# Patient Record
Sex: MALE | Race: WHITE | NOT HISPANIC OR LATINO
[De-identification: names, ages, dates, MRNs, and addresses within clinical notes are randomized per-mention and may not be internally consistent; named-entity substitution may affect disease eponyms.]

---

## 2017-01-05 ENCOUNTER — APPOINTMENT (OUTPATIENT)
Dept: INFUSION THERAPY | Facility: CLINIC | Age: 47
End: 2017-01-05

## 2017-01-05 ENCOUNTER — APPOINTMENT (OUTPATIENT)
Dept: HEMATOLOGY ONCOLOGY | Facility: CLINIC | Age: 47
End: 2017-01-05

## 2017-01-05 VITALS
DIASTOLIC BLOOD PRESSURE: 106 MMHG | HEART RATE: 76 BPM | SYSTOLIC BLOOD PRESSURE: 165 MMHG | TEMPERATURE: 97.3 F | RESPIRATION RATE: 12 BRPM

## 2017-01-05 DIAGNOSIS — D75.1 SECONDARY POLYCYTHEMIA: ICD-10-CM

## 2017-01-05 LAB
BASOPHILS # BLD: 0.07 TH/MM3
BASOPHILS NFR BLD: 0.9 %
EOSINOPHIL # BLD: 0.22 TH/MM3
EOSINOPHIL NFR BLD: 2.8 %
ERYTHROCYTE [DISTWIDTH] IN BLOOD BY AUTOMATED COUNT: 14.4 %
GRANULOCYTES # BLD: 4.18 TH/MM3
GRANULOCYTES NFR BLD: 52.9 %
HCT VFR BLD AUTO: 54.7 %
HGB BLD-MCNC: 18.7 G/DL
IMM GRANULOCYTES # BLD: 0.08 TH/MM3
IMM GRANULOCYTES NFR BLD: 1 %
LYMPHOCYTES # BLD: 2.63 TH/MM3
LYMPHOCYTES NFR BLD: 33.3 %
MCH RBC QN AUTO: 29.9 PG
MCHC RBC AUTO-ENTMCNC: 34.2 G/DL
MCV RBC AUTO: 87.5 FL
MONOCYTES # BLD: 0.72 TH/MM3
MONOCYTES NFR BLD: 9.1 %
PLATELET # BLD: 248 TH/MM3
PMV BLD AUTO: 10.1 FL
RBC # BLD AUTO: 6.25 MIL/MM3
WBC # BLD: 7.9 TH/MM3

## 2017-02-02 ENCOUNTER — APPOINTMENT (OUTPATIENT)
Dept: INFUSION THERAPY | Facility: CLINIC | Age: 47
End: 2017-02-02

## 2017-02-07 LAB
BASOPHILS # BLD: 0.06 TH/MM3
BASOPHILS NFR BLD: 0.8 %
EOSINOPHIL # BLD: 0.19 TH/MM3
EOSINOPHIL NFR BLD: 2.5 %
ERYTHROCYTE [DISTWIDTH] IN BLOOD BY AUTOMATED COUNT: 14.1 %
GRANULOCYTES # BLD: 4.05 TH/MM3
GRANULOCYTES NFR BLD: 52.8 %
HCT VFR BLD AUTO: 50 %
HGB BLD-MCNC: 18.2 G/DL
IMM GRANULOCYTES # BLD: 0.07 TH/MM3
IMM GRANULOCYTES NFR BLD: 0.9 %
LYMPHOCYTES # BLD: 2.81 TH/MM3
LYMPHOCYTES NFR BLD: 36.6 %
MCH RBC QN AUTO: 29.8 PG
MCHC RBC AUTO-ENTMCNC: 36.4 G/DL
MCV RBC AUTO: 81.8 FL
MONOCYTES # BLD: 0.49 TH/MM3
MONOCYTES NFR BLD: 6.4 %
PLATELET # BLD: 236 TH/MM3
PMV BLD AUTO: 9.8 FL
RBC # BLD AUTO: 6.11 MIL/MM3
WBC # BLD: 7.67 TH/MM3

## 2017-03-02 ENCOUNTER — APPOINTMENT (OUTPATIENT)
Dept: INFUSION THERAPY | Facility: CLINIC | Age: 47
End: 2017-03-02

## 2017-03-06 LAB
BASOPHILS # BLD: 0.08 TH/MM3
BASOPHILS NFR BLD: 1 %
EOSINOPHIL # BLD: 0.31 TH/MM3
EOSINOPHIL NFR BLD: 3.7 %
ERYTHROCYTE [DISTWIDTH] IN BLOOD BY AUTOMATED COUNT: 14.2 %
GRANULOCYTES # BLD: 4.32 TH/MM3
GRANULOCYTES NFR BLD: 52.3 %
HCT VFR BLD AUTO: 51.6 %
HGB BLD-MCNC: 17.7 G/DL
IMM GRANULOCYTES # BLD: 0.11 TH/MM3
IMM GRANULOCYTES NFR BLD: 1.3 %
LYMPHOCYTES # BLD: 2.62 TH/MM3
LYMPHOCYTES NFR BLD: 31.7 %
MCH RBC QN AUTO: 29.7 PG
MCHC RBC AUTO-ENTMCNC: 34.3 G/DL
MCV RBC AUTO: 86.7 FL
MONOCYTES # BLD: 0.83 TH/MM3
MONOCYTES NFR BLD: 10 %
PLATELET # BLD: 232 TH/MM3
PMV BLD AUTO: 10.1 FL
RBC # BLD AUTO: 5.95 MIL/MM3
WBC # BLD: 8.27 TH/MM3

## 2017-03-30 ENCOUNTER — APPOINTMENT (OUTPATIENT)
Dept: INFUSION THERAPY | Facility: CLINIC | Age: 47
End: 2017-03-30

## 2017-03-30 ENCOUNTER — OUTPATIENT (OUTPATIENT)
Dept: OUTPATIENT SERVICES | Facility: HOSPITAL | Age: 47
LOS: 1 days | Discharge: HOME | End: 2017-03-30

## 2017-03-30 DIAGNOSIS — Z00.00 ENCOUNTER FOR GENERAL ADULT MEDICAL EXAMINATION W/OUT ABNORMAL FINDINGS: ICD-10-CM

## 2017-03-30 LAB
BASOPHILS # BLD: 0.06 TH/MM3
BASOPHILS NFR BLD: 0.7 %
EOSINOPHIL # BLD: 0.13 TH/MM3
EOSINOPHIL NFR BLD: 1.6 %
ERYTHROCYTE [DISTWIDTH] IN BLOOD BY AUTOMATED COUNT: 14.4 %
GRANULOCYTES # BLD: 4.09 TH/MM3
GRANULOCYTES NFR BLD: 51.2 %
HCT VFR BLD AUTO: 53.9 %
HGB BLD-MCNC: 18.5 G/DL
IMM GRANULOCYTES # BLD: 0.05 TH/MM3
IMM GRANULOCYTES NFR BLD: 0.6 %
LYMPHOCYTES # BLD: 3.02 TH/MM3
LYMPHOCYTES NFR BLD: 37.7 %
MCH RBC QN AUTO: 29.4 PG
MCHC RBC AUTO-ENTMCNC: 34.3 G/DL
MCV RBC AUTO: 85.7 FL
MONOCYTES # BLD: 0.66 TH/MM3
MONOCYTES NFR BLD: 8.2 %
PLATELET # BLD: 229 TH/MM3
PMV BLD AUTO: 10.5 FL
RBC # BLD AUTO: 6.29 MIL/MM3
WBC # BLD: 8.01 TH/MM3

## 2017-03-31 ENCOUNTER — OTHER (OUTPATIENT)
Age: 47
End: 2017-03-31

## 2017-04-20 ENCOUNTER — APPOINTMENT (OUTPATIENT)
Dept: INFUSION THERAPY | Facility: CLINIC | Age: 47
End: 2017-04-20

## 2017-05-04 ENCOUNTER — APPOINTMENT (OUTPATIENT)
Dept: INFUSION THERAPY | Facility: CLINIC | Age: 47
End: 2017-05-04

## 2017-05-04 ENCOUNTER — APPOINTMENT (OUTPATIENT)
Dept: HEMATOLOGY ONCOLOGY | Facility: CLINIC | Age: 47
End: 2017-05-04

## 2017-06-25 ENCOUNTER — EMERGENCY (EMERGENCY)
Facility: HOSPITAL | Age: 47
LOS: 0 days | Discharge: HOME | End: 2017-06-25
Admitting: INTERNAL MEDICINE

## 2017-06-25 DIAGNOSIS — Z79.899 OTHER LONG TERM (CURRENT) DRUG THERAPY: ICD-10-CM

## 2017-06-25 DIAGNOSIS — R53.83 OTHER FATIGUE: ICD-10-CM

## 2017-06-25 DIAGNOSIS — R53.81 OTHER MALAISE: ICD-10-CM

## 2017-06-25 DIAGNOSIS — I21.3 ST ELEVATION (STEMI) MYOCARDIAL INFARCTION OF UNSPECIFIED SITE: ICD-10-CM

## 2017-06-25 DIAGNOSIS — R06.02 SHORTNESS OF BREATH: ICD-10-CM

## 2017-06-25 DIAGNOSIS — I10 ESSENTIAL (PRIMARY) HYPERTENSION: ICD-10-CM

## 2017-06-25 DIAGNOSIS — Z95.5 PRESENCE OF CORONARY ANGIOPLASTY IMPLANT AND GRAFT: ICD-10-CM

## 2017-06-27 DIAGNOSIS — D75.1 SECONDARY POLYCYTHEMIA: ICD-10-CM

## 2019-05-29 ENCOUNTER — OUTPATIENT (OUTPATIENT)
Dept: OUTPATIENT SERVICES | Facility: HOSPITAL | Age: 49
LOS: 1 days | Discharge: HOME | End: 2019-05-29
Payer: MEDICAID

## 2019-05-29 VITALS
DIASTOLIC BLOOD PRESSURE: 93 MMHG | WEIGHT: 233.47 LBS | HEART RATE: 61 BPM | RESPIRATION RATE: 14 BRPM | OXYGEN SATURATION: 100 % | SYSTOLIC BLOOD PRESSURE: 147 MMHG | TEMPERATURE: 96 F | HEIGHT: 69 IN

## 2019-05-29 DIAGNOSIS — G56.01 CARPAL TUNNEL SYNDROME, RIGHT UPPER LIMB: ICD-10-CM

## 2019-05-29 DIAGNOSIS — Z01.818 ENCOUNTER FOR OTHER PREPROCEDURAL EXAMINATION: ICD-10-CM

## 2019-05-29 LAB
ALBUMIN SERPL ELPH-MCNC: 4.6 G/DL — SIGNIFICANT CHANGE UP (ref 3.5–5.2)
ALP SERPL-CCNC: 80 U/L — SIGNIFICANT CHANGE UP (ref 30–115)
ALT FLD-CCNC: 39 U/L — SIGNIFICANT CHANGE UP (ref 0–41)
ANION GAP SERPL CALC-SCNC: 12 MMOL/L — SIGNIFICANT CHANGE UP (ref 7–14)
APTT BLD: 32 SEC — SIGNIFICANT CHANGE UP (ref 27–39.2)
AST SERPL-CCNC: 26 U/L — SIGNIFICANT CHANGE UP (ref 0–41)
BILIRUB SERPL-MCNC: 0.7 MG/DL — SIGNIFICANT CHANGE UP (ref 0.2–1.2)
BUN SERPL-MCNC: 18 MG/DL — SIGNIFICANT CHANGE UP (ref 10–20)
CALCIUM SERPL-MCNC: 9.8 MG/DL — SIGNIFICANT CHANGE UP (ref 8.5–10.1)
CHLORIDE SERPL-SCNC: 103 MMOL/L — SIGNIFICANT CHANGE UP (ref 98–110)
CO2 SERPL-SCNC: 27 MMOL/L — SIGNIFICANT CHANGE UP (ref 17–32)
CREAT SERPL-MCNC: 0.9 MG/DL — SIGNIFICANT CHANGE UP (ref 0.7–1.5)
GLUCOSE SERPL-MCNC: 94 MG/DL — SIGNIFICANT CHANGE UP (ref 70–99)
HCT VFR BLD CALC: 44.5 % — SIGNIFICANT CHANGE UP (ref 42–52)
HGB BLD-MCNC: 13.6 G/DL — LOW (ref 14–18)
INR BLD: 0.96 RATIO — SIGNIFICANT CHANGE UP (ref 0.65–1.3)
MCHC RBC-ENTMCNC: 24.1 PG — LOW (ref 27–31)
MCHC RBC-ENTMCNC: 30.6 G/DL — LOW (ref 32–37)
MCV RBC AUTO: 78.8 FL — LOW (ref 80–94)
NRBC # BLD: 0 /100 WBCS — SIGNIFICANT CHANGE UP (ref 0–0)
PLATELET # BLD AUTO: 282 K/UL — SIGNIFICANT CHANGE UP (ref 130–400)
POTASSIUM SERPL-MCNC: 4.4 MMOL/L — SIGNIFICANT CHANGE UP (ref 3.5–5)
POTASSIUM SERPL-SCNC: 4.4 MMOL/L — SIGNIFICANT CHANGE UP (ref 3.5–5)
PROT SERPL-MCNC: 7.5 G/DL — SIGNIFICANT CHANGE UP (ref 6–8)
PROTHROM AB SERPL-ACNC: 11 SEC — SIGNIFICANT CHANGE UP (ref 9.95–12.87)
RBC # BLD: 5.65 M/UL — SIGNIFICANT CHANGE UP (ref 4.7–6.1)
RBC # FLD: 18.8 % — HIGH (ref 11.5–14.5)
SODIUM SERPL-SCNC: 142 MMOL/L — SIGNIFICANT CHANGE UP (ref 135–146)
WBC # BLD: 7.05 K/UL — SIGNIFICANT CHANGE UP (ref 4.8–10.8)
WBC # FLD AUTO: 7.05 K/UL — SIGNIFICANT CHANGE UP (ref 4.8–10.8)

## 2019-05-29 PROCEDURE — 71046 X-RAY EXAM CHEST 2 VIEWS: CPT | Mod: 26

## 2019-05-29 PROCEDURE — 93010 ELECTROCARDIOGRAM REPORT: CPT

## 2019-05-29 NOTE — H&P PST ADULT - HISTORY OF PRESENT ILLNESS
48 year old male here for right hand surgery as been having problems for about 6 months , hurt falling feb 2019  fos=3  denies + chest pain ,states has it now gets it a lot states jose julian gave him xanax and does not want to take anymore. refuses to go to ed as we speak. denies sob denies palp  denies recent uri or uti

## 2019-05-29 NOTE — H&P PST ADULT - NSICDXPASTMEDICALHX_GEN_ALL_CORE_FT
PAST MEDICAL HISTORY:  Heart attack 3 years ago, 4 stents placed    History of angina     HTN (hypertension)     Hypercholesteremia     Obesity     Polycythemia     Sleep apnea does not use c-pap

## 2019-06-13 NOTE — PROGRESS NOTE ADULT - SUBJECTIVE AND OBJECTIVE BOX
PAST documents reviewed - MED. DIR. PAST - ANESTHESIA - as of this review : patient is a CAD patient w/ stents and Angina Pectoris - a Cardiology consult was requested and received " clear from a cardiac standpoint " .  I feel the patient's low risk surgery may proceed w/o further evaluations .

## 2019-06-14 NOTE — ASU PATIENT PROFILE, ADULT - PMH
Heart attack  3 years ago, 4 stents placed  History of angina    HTN (hypertension)    Hypercholesteremia    Obesity    Polycythemia    Sleep apnea  does not use c-pap

## 2019-06-17 ENCOUNTER — OUTPATIENT (OUTPATIENT)
Dept: OUTPATIENT SERVICES | Facility: HOSPITAL | Age: 49
LOS: 1 days | Discharge: HOME | End: 2019-06-17

## 2019-06-17 VITALS
DIASTOLIC BLOOD PRESSURE: 87 MMHG | SYSTOLIC BLOOD PRESSURE: 136 MMHG | OXYGEN SATURATION: 98 % | HEART RATE: 60 BPM | RESPIRATION RATE: 18 BRPM

## 2019-06-17 VITALS
RESPIRATION RATE: 17 BRPM | DIASTOLIC BLOOD PRESSURE: 85 MMHG | WEIGHT: 233.47 LBS | TEMPERATURE: 98 F | HEART RATE: 71 BPM | HEIGHT: 69 IN | SYSTOLIC BLOOD PRESSURE: 137 MMHG | OXYGEN SATURATION: 98 %

## 2019-06-17 RX ORDER — SODIUM CHLORIDE 9 MG/ML
1000 INJECTION, SOLUTION INTRAVENOUS
Refills: 0 | Status: DISCONTINUED | OUTPATIENT
Start: 2019-06-17 | End: 2019-07-02

## 2019-06-17 RX ORDER — ACETAMINOPHEN 500 MG
1000 TABLET ORAL ONCE
Refills: 0 | Status: COMPLETED | OUTPATIENT
Start: 2019-06-17 | End: 2019-06-17

## 2019-06-17 RX ADMIN — SODIUM CHLORIDE 75 MILLILITER(S): 9 INJECTION, SOLUTION INTRAVENOUS at 09:38

## 2019-06-17 RX ADMIN — Medication 400 MILLIGRAM(S): at 10:01

## 2019-06-17 NOTE — CHART NOTE - NSCHARTNOTEFT_GEN_A_CORE
PACU ANESTHESIA ADMISSION NOTE      Procedure: Endoscopic carpal tunnel release: right    Post op diagnosis:  Carpal tunnel syndrome on right      ____  Intubated  TV:______       Rate: ______      FiO2: ______    __X__  Patent Airway    ___X_  Full return of protective reflexes    ____X  Full recovery from anesthesia / back to baseline     Vitals:   T: 37          R:    14              BP:139/89                  Sat:97                   P: 77      Mental Status:  ___X_ Awake   _____ Alert   _____ Drowsy   _____ Sedated    Nausea/Vomiting:  __X__ NO  ______Yes,   See Post - Op Orders          Pain Scale (0-10):  _X____    Treatment: ____ None    ____ See Post - Op/PCA Orders    Post - Operative Fluids:   X____ Oral   ____ See Post - Op Orders    Plan: Discharge:   __X__Home       _____Floor     _____Critical Care    _____  Other:_________________    Comments:  Uneventful course of anesthesia

## 2019-06-21 DIAGNOSIS — G56.01 CARPAL TUNNEL SYNDROME, RIGHT UPPER LIMB: ICD-10-CM

## 2019-06-21 DIAGNOSIS — I10 ESSENTIAL (PRIMARY) HYPERTENSION: ICD-10-CM

## 2019-06-21 DIAGNOSIS — G47.33 OBSTRUCTIVE SLEEP APNEA (ADULT) (PEDIATRIC): ICD-10-CM

## 2019-06-21 DIAGNOSIS — E66.9 OBESITY, UNSPECIFIED: ICD-10-CM

## 2019-06-21 DIAGNOSIS — E78.00 PURE HYPERCHOLESTEROLEMIA, UNSPECIFIED: ICD-10-CM

## 2020-09-28 PROBLEM — I10 ESSENTIAL (PRIMARY) HYPERTENSION: Chronic | Status: ACTIVE | Noted: 2019-05-29

## 2020-09-28 PROBLEM — D75.1 SECONDARY POLYCYTHEMIA: Chronic | Status: ACTIVE | Noted: 2019-05-29

## 2020-09-28 PROBLEM — E66.9 OBESITY, UNSPECIFIED: Chronic | Status: ACTIVE | Noted: 2019-05-29

## 2020-09-28 PROBLEM — E78.00 PURE HYPERCHOLESTEROLEMIA, UNSPECIFIED: Chronic | Status: ACTIVE | Noted: 2019-05-29

## 2020-09-28 PROBLEM — Z86.79 PERSONAL HISTORY OF OTHER DISEASES OF THE CIRCULATORY SYSTEM: Chronic | Status: ACTIVE | Noted: 2019-05-29

## 2020-10-01 ENCOUNTER — OUTPATIENT (OUTPATIENT)
Dept: OUTPATIENT SERVICES | Facility: HOSPITAL | Age: 50
LOS: 1 days | End: 2020-10-01
Payer: MEDICAID

## 2020-10-03 ENCOUNTER — OUTPATIENT (OUTPATIENT)
Dept: OUTPATIENT SERVICES | Facility: HOSPITAL | Age: 50
LOS: 1 days | Discharge: HOME | End: 2020-10-03

## 2020-10-03 DIAGNOSIS — Z11.59 ENCOUNTER FOR SCREENING FOR OTHER VIRAL DISEASES: ICD-10-CM

## 2020-10-06 ENCOUNTER — OUTPATIENT (OUTPATIENT)
Dept: OUTPATIENT SERVICES | Facility: HOSPITAL | Age: 50
LOS: 1 days | Discharge: HOME | End: 2020-10-06

## 2020-10-06 VITALS
RESPIRATION RATE: 16 BRPM | SYSTOLIC BLOOD PRESSURE: 130 MMHG | DIASTOLIC BLOOD PRESSURE: 81 MMHG | HEART RATE: 86 BPM | WEIGHT: 240.08 LBS | TEMPERATURE: 98 F | HEIGHT: 69 IN | OXYGEN SATURATION: 100 %

## 2020-10-06 VITALS
WEIGHT: 240.08 LBS | HEART RATE: 74 BPM | OXYGEN SATURATION: 98 % | SYSTOLIC BLOOD PRESSURE: 130 MMHG | RESPIRATION RATE: 12 BRPM | HEIGHT: 69 IN | DIASTOLIC BLOOD PRESSURE: 81 MMHG

## 2020-10-06 LAB
ANION GAP SERPL CALC-SCNC: 10 MMOL/L — SIGNIFICANT CHANGE UP (ref 7–14)
BUN SERPL-MCNC: 19 MG/DL — SIGNIFICANT CHANGE UP (ref 10–20)
CALCIUM SERPL-MCNC: 8.9 MG/DL — SIGNIFICANT CHANGE UP (ref 8.5–10.1)
CHLORIDE SERPL-SCNC: 104 MMOL/L — SIGNIFICANT CHANGE UP (ref 98–110)
CO2 SERPL-SCNC: 26 MMOL/L — SIGNIFICANT CHANGE UP (ref 17–32)
CREAT SERPL-MCNC: 1 MG/DL — SIGNIFICANT CHANGE UP (ref 0.7–1.5)
GLUCOSE SERPL-MCNC: 109 MG/DL — HIGH (ref 70–99)
HCT VFR BLD CALC: 48.2 % — SIGNIFICANT CHANGE UP (ref 42–52)
HGB BLD-MCNC: 14.7 G/DL — SIGNIFICANT CHANGE UP (ref 14–18)
MCHC RBC-ENTMCNC: 25 PG — LOW (ref 27–31)
MCHC RBC-ENTMCNC: 30.5 G/DL — LOW (ref 32–37)
MCV RBC AUTO: 82.1 FL — SIGNIFICANT CHANGE UP (ref 80–94)
NRBC # BLD: 0 /100 WBCS — SIGNIFICANT CHANGE UP (ref 0–0)
PLATELET # BLD AUTO: 318 K/UL — SIGNIFICANT CHANGE UP (ref 130–400)
POTASSIUM SERPL-MCNC: 4.1 MMOL/L — SIGNIFICANT CHANGE UP (ref 3.5–5)
POTASSIUM SERPL-SCNC: 4.1 MMOL/L — SIGNIFICANT CHANGE UP (ref 3.5–5)
RBC # BLD: 5.87 M/UL — SIGNIFICANT CHANGE UP (ref 4.7–6.1)
RBC # FLD: 17.1 % — HIGH (ref 11.5–14.5)
SODIUM SERPL-SCNC: 140 MMOL/L — SIGNIFICANT CHANGE UP (ref 135–146)
WBC # BLD: 7.32 K/UL — SIGNIFICANT CHANGE UP (ref 4.8–10.8)
WBC # FLD AUTO: 7.32 K/UL — SIGNIFICANT CHANGE UP (ref 4.8–10.8)

## 2020-10-06 RX ORDER — OXYCODONE AND ACETAMINOPHEN 5; 325 MG/1; MG/1
0 TABLET ORAL
Qty: 0 | Refills: 0 | DISCHARGE

## 2020-10-06 NOTE — CHART NOTE - NSCHARTNOTEFT_GEN_A_CORE
PRE-OP DIAGNOSIS: suspected CAD/stable angina    PROCEDURE: [facility] with coronary angiography    Physician: REMINGTON Geller  Assistant: BILL Kaminski    ANESTHESIA TYPE:  [ x ] Sedation  [ x ] Local/Regional    ESTIMATED BLOOD LOSS:    10   mL    CONDITION  [ x ]Good    SPECIMENS REMOVED (IF APPLICABLE): None    IV CONTRAST:    50    mL    FINDINGS    Left Heart Catheterization:  LVEF%: 60  LVEDP: 5  Non-obstructive CAD  Right radial 6 Fr - radial D stat    The coronary circulation is right dominant. There was no angiographic evidence for occlusive coronary artery disease. Left main: Normal. The vessel was medium sized. LAD: The vessel was medium sized. Proximal LAD: Angiography showed minor luminal irregularities with no flow limiting lesions. Mid LAD: The vessel was medium sized. Angiography showed mild atherosclerosis with no flow limiting lesions. Distal LAD: Normal. 1st diagonal: Normal. Circumflex: The vessel was medium sized. Proximal circumflex: Normal. There was a 0 % stenosis at the site of a prior stent. Distal circumflex: Normal. 1st obtuse marginal: Normal. The vessel was medium sized. 2nd obtuse marginal: The vessel was medium sized. Angiography showed minor luminal irregularities with no flow limiting lesions. There was a 0 % stenosis at the site of a prior stent. RCA: The vessel was large sized (dominant). Proximal RCA: There was a 0 % stenosis at the site of a prior stent. Mid RCA: There was a 0 % stenosis at the site of a prior stent. Distal RCA: Normal. Right PDA: Normal. Right posterolateral segment: Normal.     POST-OP DIAGNOSIS  Non-obstructive CAD    PLAN OF CARE  [x ] D/C Home today   cc/hr for 4 hours

## 2020-10-06 NOTE — H&P CARDIOLOGY - HISTORY OF PRESENT ILLNESS
50 y/old M here for [facility] due to Hx of CAD and New symptoms: Severe fatigue which remind him of CAD in PAST. After phlebotomy for Polycythemia he still feels fatigue.   PMH: MI, CAD, PCI stents x4, EDDIE, DLD, Polycythemia secondary to smoking.      Pre cath note:    indication:  [x] STEMI                [ ] NSTEMI                 [ ] Acute coronary syndrome                     [ ]Unstable Angina   [ ] high risk  [ ] intermediate risk  [ ] low risk                     [x] Stable Angina     non-invasive testing:                          Date:                     result: [ ] high risk  [x] intermediate risk  [ ] low risk    Anti- Anginal medications:                    [ ] not used                       [x] used                   [ ] not used but strong indication not to use    Ejection Fraction                   [ ] <29            [ ] 30-39%   [ ] 40-49%     [x]>50%    CHF                   [ ] active (within last 14 days on meds   [ ] Chronic (on meds but no exacerbation)    COPD                   [ ] mild (on chronic bronchodilators)  [ ] moderate (on chronic steroid therapy)      [ ] severe (indication for home O2 or PACO2 >50)    Other risk factors:                       [x ] Previous MI                     [ ] CVA/ stroke                    [ ] carotid stent/ CEA                    [ ] PVD/PAD- (arterial aneurysm, non-palpable pulses, tortuous vessel with inability to insert catheter, infra-renal dissection, renal or subclavian artery stenosis)                    [ ] diabetic                    [ ] previous CABG                    [ ] Renal Failure                           14.7   7.32  )-----------( 318      ( 06 Oct 2020 08:15 )             48.2       -Start IV Fluids NS at : 3ml/Kg for 1 Hr prior to procedure

## 2020-10-09 DIAGNOSIS — Z95.5 PRESENCE OF CORONARY ANGIOPLASTY IMPLANT AND GRAFT: ICD-10-CM

## 2020-10-09 DIAGNOSIS — E78.49 OTHER HYPERLIPIDEMIA: ICD-10-CM

## 2020-10-09 DIAGNOSIS — D75.1 SECONDARY POLYCYTHEMIA: ICD-10-CM

## 2020-10-09 DIAGNOSIS — Z79.82 LONG TERM (CURRENT) USE OF ASPIRIN: ICD-10-CM

## 2020-10-09 DIAGNOSIS — G47.33 OBSTRUCTIVE SLEEP APNEA (ADULT) (PEDIATRIC): ICD-10-CM

## 2020-10-09 DIAGNOSIS — R07.9 CHEST PAIN, UNSPECIFIED: ICD-10-CM

## 2020-10-09 DIAGNOSIS — I25.118 ATHEROSCLEROTIC HEART DISEASE OF NATIVE CORONARY ARTERY WITH OTHER FORMS OF ANGINA PECTORIS: ICD-10-CM

## 2020-10-09 DIAGNOSIS — I25.2 OLD MYOCARDIAL INFARCTION: ICD-10-CM

## 2020-10-09 DIAGNOSIS — I10 ESSENTIAL (PRIMARY) HYPERTENSION: ICD-10-CM

## 2020-10-09 DIAGNOSIS — E66.9 OBESITY, UNSPECIFIED: ICD-10-CM

## 2020-10-10 ENCOUNTER — TRANSCRIPTION ENCOUNTER (OUTPATIENT)
Age: 50
End: 2020-10-10

## 2020-10-10 ENCOUNTER — EMERGENCY (EMERGENCY)
Facility: HOSPITAL | Age: 50
LOS: 0 days | Discharge: HOME | End: 2020-10-11
Attending: EMERGENCY MEDICINE | Admitting: HOSPITALIST
Payer: MEDICAID

## 2020-10-10 VITALS — WEIGHT: 240.08 LBS

## 2020-10-10 VITALS — OXYGEN SATURATION: 98 % | TEMPERATURE: 98 F | RESPIRATION RATE: 18 BRPM | HEART RATE: 73 BPM

## 2020-10-10 DIAGNOSIS — G47.33 OBSTRUCTIVE SLEEP APNEA (ADULT) (PEDIATRIC): ICD-10-CM

## 2020-10-10 DIAGNOSIS — Z87.891 PERSONAL HISTORY OF NICOTINE DEPENDENCE: ICD-10-CM

## 2020-10-10 DIAGNOSIS — I10 ESSENTIAL (PRIMARY) HYPERTENSION: ICD-10-CM

## 2020-10-10 DIAGNOSIS — M79.601 PAIN IN RIGHT ARM: ICD-10-CM

## 2020-10-10 DIAGNOSIS — R60.0 LOCALIZED EDEMA: ICD-10-CM

## 2020-10-10 DIAGNOSIS — E78.5 HYPERLIPIDEMIA, UNSPECIFIED: ICD-10-CM

## 2020-10-10 DIAGNOSIS — Z79.82 LONG TERM (CURRENT) USE OF ASPIRIN: ICD-10-CM

## 2020-10-10 DIAGNOSIS — I25.10 ATHEROSCLEROTIC HEART DISEASE OF NATIVE CORONARY ARTERY WITHOUT ANGINA PECTORIS: ICD-10-CM

## 2020-10-10 LAB
ALBUMIN SERPL ELPH-MCNC: 4.7 G/DL — SIGNIFICANT CHANGE UP (ref 3.5–5.2)
ALP SERPL-CCNC: 80 U/L — SIGNIFICANT CHANGE UP (ref 30–115)
ALT FLD-CCNC: 38 U/L — SIGNIFICANT CHANGE UP (ref 0–41)
ANION GAP SERPL CALC-SCNC: 14 MMOL/L — SIGNIFICANT CHANGE UP (ref 7–14)
APTT BLD: 30 SEC — SIGNIFICANT CHANGE UP (ref 27–39.2)
AST SERPL-CCNC: 26 U/L — SIGNIFICANT CHANGE UP (ref 0–41)
BASOPHILS # BLD AUTO: 0.09 K/UL — SIGNIFICANT CHANGE UP (ref 0–0.2)
BASOPHILS NFR BLD AUTO: 0.8 % — SIGNIFICANT CHANGE UP (ref 0–1)
BILIRUB SERPL-MCNC: 0.5 MG/DL — SIGNIFICANT CHANGE UP (ref 0.2–1.2)
BUN SERPL-MCNC: 14 MG/DL — SIGNIFICANT CHANGE UP (ref 10–20)
CALCIUM SERPL-MCNC: 9.8 MG/DL — SIGNIFICANT CHANGE UP (ref 8.5–10.1)
CHLORIDE SERPL-SCNC: 100 MMOL/L — SIGNIFICANT CHANGE UP (ref 98–110)
CO2 SERPL-SCNC: 27 MMOL/L — SIGNIFICANT CHANGE UP (ref 17–32)
CREAT SERPL-MCNC: 1.1 MG/DL — SIGNIFICANT CHANGE UP (ref 0.7–1.5)
EOSINOPHIL # BLD AUTO: 0.35 K/UL — SIGNIFICANT CHANGE UP (ref 0–0.7)
EOSINOPHIL NFR BLD AUTO: 3.2 % — SIGNIFICANT CHANGE UP (ref 0–8)
GLUCOSE SERPL-MCNC: 89 MG/DL — SIGNIFICANT CHANGE UP (ref 70–99)
HCT VFR BLD CALC: 49.5 % — SIGNIFICANT CHANGE UP (ref 42–52)
HGB BLD-MCNC: 14.9 G/DL — SIGNIFICANT CHANGE UP (ref 14–18)
IMM GRANULOCYTES NFR BLD AUTO: 0.3 % — SIGNIFICANT CHANGE UP (ref 0.1–0.3)
INR BLD: 1 RATIO — SIGNIFICANT CHANGE UP (ref 0.65–1.3)
LYMPHOCYTES # BLD AUTO: 28 % — SIGNIFICANT CHANGE UP (ref 20.5–51.1)
LYMPHOCYTES # BLD AUTO: 3.1 K/UL — SIGNIFICANT CHANGE UP (ref 1.2–3.4)
MCHC RBC-ENTMCNC: 24.5 PG — LOW (ref 27–31)
MCHC RBC-ENTMCNC: 30.1 G/DL — LOW (ref 32–37)
MCV RBC AUTO: 81.3 FL — SIGNIFICANT CHANGE UP (ref 80–94)
MONOCYTES # BLD AUTO: 0.76 K/UL — HIGH (ref 0.1–0.6)
MONOCYTES NFR BLD AUTO: 6.9 % — SIGNIFICANT CHANGE UP (ref 1.7–9.3)
NEUTROPHILS # BLD AUTO: 6.73 K/UL — HIGH (ref 1.4–6.5)
NEUTROPHILS NFR BLD AUTO: 60.8 % — SIGNIFICANT CHANGE UP (ref 42.2–75.2)
NRBC # BLD: 0 /100 WBCS — SIGNIFICANT CHANGE UP (ref 0–0)
PLATELET # BLD AUTO: 328 K/UL — SIGNIFICANT CHANGE UP (ref 130–400)
POTASSIUM SERPL-MCNC: 4.1 MMOL/L — SIGNIFICANT CHANGE UP (ref 3.5–5)
POTASSIUM SERPL-SCNC: 4.1 MMOL/L — SIGNIFICANT CHANGE UP (ref 3.5–5)
PROT SERPL-MCNC: 7.4 G/DL — SIGNIFICANT CHANGE UP (ref 6–8)
PROTHROM AB SERPL-ACNC: 11.5 SEC — SIGNIFICANT CHANGE UP (ref 9.95–12.87)
RAPID RVP RESULT: SIGNIFICANT CHANGE UP
RBC # BLD: 6.09 M/UL — SIGNIFICANT CHANGE UP (ref 4.7–6.1)
RBC # FLD: 17.2 % — HIGH (ref 11.5–14.5)
SARS-COV-2 RNA SPEC QL NAA+PROBE: SIGNIFICANT CHANGE UP
SODIUM SERPL-SCNC: 141 MMOL/L — SIGNIFICANT CHANGE UP (ref 135–146)
WBC # BLD: 11.06 K/UL — HIGH (ref 4.8–10.8)
WBC # FLD AUTO: 11.06 K/UL — HIGH (ref 4.8–10.8)

## 2020-10-10 PROCEDURE — 93010 ELECTROCARDIOGRAM REPORT: CPT

## 2020-10-10 PROCEDURE — 99285 EMERGENCY DEPT VISIT HI MDM: CPT

## 2020-10-10 PROCEDURE — 93971 EXTREMITY STUDY: CPT | Mod: 26,RT

## 2020-10-10 PROCEDURE — 99406 BEHAV CHNG SMOKING 3-10 MIN: CPT

## 2020-10-10 PROCEDURE — 99236 HOSP IP/OBS SAME DATE HI 85: CPT | Mod: 25

## 2020-10-10 PROCEDURE — 93931 UPPER EXTREMITY STUDY: CPT | Mod: 26,RT

## 2020-10-10 RX ORDER — HEPARIN SODIUM 5000 [USP'U]/ML
INJECTION INTRAVENOUS; SUBCUTANEOUS
Qty: 25000 | Refills: 0 | Status: DISCONTINUED | OUTPATIENT
Start: 2020-10-10 | End: 2020-10-10

## 2020-10-10 RX ORDER — KETOROLAC TROMETHAMINE 30 MG/ML
30 SYRINGE (ML) INJECTION ONCE
Refills: 0 | Status: DISCONTINUED | OUTPATIENT
Start: 2020-10-10 | End: 2020-10-10

## 2020-10-10 RX ORDER — SIMVASTATIN 20 MG/1
20 TABLET, FILM COATED ORAL AT BEDTIME
Refills: 0 | Status: DISCONTINUED | OUTPATIENT
Start: 2020-10-10 | End: 2020-10-11

## 2020-10-10 RX ORDER — HEPARIN SODIUM 5000 [USP'U]/ML
9000 INJECTION INTRAVENOUS; SUBCUTANEOUS ONCE
Refills: 0 | Status: DISCONTINUED | OUTPATIENT
Start: 2020-10-10 | End: 2020-10-10

## 2020-10-10 RX ORDER — LOSARTAN POTASSIUM 100 MG/1
25 TABLET, FILM COATED ORAL DAILY
Refills: 0 | Status: DISCONTINUED | OUTPATIENT
Start: 2020-10-10 | End: 2020-10-11

## 2020-10-10 RX ORDER — ASPIRIN/CALCIUM CARB/MAGNESIUM 324 MG
81 TABLET ORAL DAILY
Refills: 0 | Status: DISCONTINUED | OUTPATIENT
Start: 2020-10-10 | End: 2020-10-11

## 2020-10-10 RX ADMIN — Medication 30 MILLIGRAM(S): at 18:11

## 2020-10-10 NOTE — H&P ADULT - HISTORY OF PRESENT ILLNESS
50 Male with PMH of angina, HTN, CAD s/p PCI w/stents (most recent last 10/6), MI, HLD, polycythemia, sleep apnea, who presents with a chief complaint of right forearm pain. Pt c/o hand burning and muscle tightness as well. Found to have possible right arterial artery occlusion on duplex scan in the ED, likely from cath. Vascular sx assessed pt with u/s of the R radial artery and ulnar artery. The artery was visible via duplex. The pt likely had radial artery spasm which resolved.     Patient denies fevers/chills, denies lightheadedness/dizziness, denies SOB/chest pain, denies nausea/vomiting, denies constipation/diarrhea.        In the ED, the pt  felt much better and wanted to go home.

## 2020-10-10 NOTE — H&P ADULT - NSHPPHYSICALEXAM_GEN_ALL_CORE
GENERAL: No acute distress, well-developed  HEAD:  Atraumatic, Normocephalic  EYES: EOMI, PERRLA, conjunctiva and sclera clear  NECK: Supple, no lymphadenopathy, no JVD  CHEST/LUNG: CTAB; No wheezes, rales, or rhonchi  HEART: Regular rate and rhythm; No murmurs, rubs, or gallops  ABDOMEN: Soft, non-tender, non-distended; normal bowel sounds, no organomegaly  EXTREMITIES:  2+ peripheral pulses b/l, radial pulses 2/3 b/l, No clubbing, cyanosis, or edema  NEUROLOGY: A&O x 3, no focal deficits  SKIN: No rashes or lesions

## 2020-10-10 NOTE — ED PROVIDER NOTE - NS ED ROS FT
Review of Systems:  	•	CONSTITUTIONAL - no fever, no diaphoresis, no chills  	•	SKIN - no rash  	•	HEMATOLOGIC - no bleeding, no bruising  	•	EYES - no eye pain, no blurry vision  	•	ENT - no change in hearing, no sore throat, no ear pain or tinnitus  	•	RESPIRATORY - no shortness of breath, no cough  	•	CARDIAC - no chest pain, no palpitations  	•	GI - no abd pain, no nausea, no vomiting, no diarrhea, no constipation  	•	GENITO-URINARY - no discharge, no dysuria; no hematuria, no increased urinary frequency  	•	MUSCULOSKELETAL - + right arm pain and swelling  	•	NEUROLOGIC - no weakness, no headache, no paresthesias, no LOC

## 2020-10-10 NOTE — ED PROVIDER NOTE - PHYSICAL EXAMINATION
CONST: Well appearing in NAD  EYES: PERRL, EOMI, Sclera and conjunctiva clear.   ENT: No nasal discharge. Oropharynx normal appearing, no erythema or exudates. No abscess or swelling. Uvula midline.   NECK: Non-tender, no meningeal signs. normal ROM. supple   CARD: Normal S1 S2; Normal rate and rhythm  RESP: Equal BS B/L, No wheezes, rhonchi or rales. No distress  GI: Soft, non-tender, non-distended. no cva tenderness. normal BS  MS: + mild swelling to right medial forearm, and tenderness along right lateral forearm. no warmth, or drainage. Normal ROM in all extremities. No midline spinal tenderness. pulses 2 +  SKIN: Warm, dry, no acute rashes. Good turgor  NEURO: Strength 5/5 with no sensory deficits to RUE

## 2020-10-10 NOTE — CONSULT NOTE ADULT - SUBJECTIVE AND OBJECTIVE BOX
VASCULAR SURGERY CONSULT     Patient is a 50y old Male with PMH of angina, HTN, coronary stents, MI, HLD, polycythemia, sleep apnea, who presents with a chief complaint of right forearm pain. Pt is s/p coranary cath on Tuesday with Dr. Geller, presents with arm pain and swelling. Pt c/o hand burning and muscle tightness as well. Found to have what looks like right arterial artery occlusion on duplex scan in the ED. Vascular surgery consult placed.     Patient denies fevers/chills, denies lightheadedness/dizziness, denies SOB/chest pain, denies nausea/vomiting, denies constipation/diarrhea.      ROS: 10-system review is otherwise negative except HPI above.      PAST MEDICAL & SURGICAL HISTORY:  Sleep apnea  does not use c-pap  History of angina  HTN (hypertension)  Polycythemia  Hypercholesteremia  Obesity  Heart attack  3 years ago, 4 stents placed    No significant past surgical history      ALLERGIES: No Known Allergies      --------------------------------------------------------------------------------------------    Vitals:   T(C): 36.6 (10-10-20 @ 16:42), Max: 36.6 (10-10-20 @ 16:42)  HR: 71 (10-10-20 @ 16:42) (71 - 71)  BP: 151/99 (10-10-20 @ 16:42) (151/99 - 151/99)  RR: 20 (10-10-20 @ 16:42) (20 - 20)  SpO2: 99% (10-10-20 @ 16:42) (99% - 99%)    Height (cm): 175.3 (10-10 @ 16:42)  Weight (kg): 108.9 (10-10 @ 16:38)  BMI (kg/m2): 35.4 (10-10 @ 16:42)  BSA (m2): 2.23 (10-10 @ 16:42)    PHYSICAL EXAM:  General: NAD, Lying in bed comfortably  Neuro: AAOx3  HEENT: PERRL, EOMI  Cardio: RRR, nml S1/S2  Resp: Good effort, CTA b/l  GI/Abd: Soft, NT/ND,  Vascular: RUE swelling near wrist and cubital fossa. Tender to palpation.   --------------------------------------------------------------------------------------------    LABS  CBC (10-10 @ 18:41)                              14.9                           11.06<H>  )----------------(  328        60.8  % Neutrophils, 28.0  % Lymphocytes, ANC: 6.73<H>                              49.5      BMP (10-10 @ 18:41)             141     |  100     |  14    		Ca++ --      Ca 9.8                ---------------------------------( 89    		Mg --                 4.1     |  27      |  1.1   			Ph --        LFTs (10-10 @ 18:41)      TPro 7.4 / Alb 4.7 / TBili 0.5 / DBili -- / AST 26 / ALT 38 / AlkPhos 80    Coags (10-10 @ 18:41)  aPTT -- / INR 1.00 / PT 11.50          --------------------------------------------------------------------------------------------    IMAGING  Bedside US performed by Dr. Mariscal, patent pulsating ulnar and radial arteries RUE. Palmar arch patent.

## 2020-10-10 NOTE — ED ADULT NURSE NOTE - OBJECTIVE STATEMENT
Pt presented with c/o right arm swelling and pain since Wednesday. Pt had recent cardiac cath on Tuesday. Denies cp/sob/numbness/tingling. Full ROM of right arm intact.

## 2020-10-10 NOTE — ED ADULT NURSE NOTE - CHPI ED NUR SYMPTOMS NEG
no decreased eating/drinking/no dizziness/no fever/no chills/no nausea/no weakness/no vomiting/no tingling

## 2020-10-10 NOTE — ED PROVIDER NOTE - OBJECTIVE STATEMENT
50 year old male with pmhx of cad, on asa, htn, hld, sleep apnea, presents with right arm pain and swelling s/p cardiac cath on tuesday. no fever, chills, chest pain, or sob.

## 2020-10-10 NOTE — DISCHARGE NOTE PROVIDER - HOSPITAL COURSE
50 Male with PMH of angina, HTN, CAD s/p PCI w/stents (most recent last 10/6), MI, HLD, polycythemia, sleep apnea, who presents with a chief complaint of right forearm pain. Pt c/o hand burning and muscle tightness as well. Found to have possible right arterial artery occlusion on duplex scan in the ED, likely from cath. Vascular sx assessed pt with u/s of the R radial artery and ulnar artery. The artery was visible via duplex. The pt likely had radial artery spasm which resolved. While In the ED, the pt felt his sxs improved and he requested to be discharged. The pt was cleared by vascular sx. Signs and sxs to be cautious about regarding ischemic lumb were discussed with the pt. He was instructed to return to the ED upon noticing any changes such as cyanosis, numbness, and cold to the touch.    50 Male with PMH of angina, HTN, CAD s/p PCI w/stents (most recent last 10/6), MI, HLD, polycythemia, sleep apnea, who presents with a chief complaint of right forearm pain. Pt c/o hand burning and muscle tightness as well. Found to have possible right arterial artery occlusion on duplex scan in the ED, likely from cath. Vascular sx assessed pt with u/s of the R radial artery and ulnar artery. The artery was visible via duplex. The pt likely had radial artery spasm which resolved. While In the ED, the pt felt his sxs improved and he requested to be discharged. The pt was cleared by vascular sx. Signs and sxs to be cautious about regarding ischemic lumb were discussed with the pt. He was instructed to return to the ED upon noticing any changes such as cyanosis, numbness, and cold to the touch.         Attending attestation:  -The pt has been cleared by vascular. Currently asymptomatic. RUE pain/swelling due to radial artery spasm s/p CATH, resolved. Extensive counseling on signs and symptoms (swelling, pain, numbness, sensation changes, and color changes) to monitor for when deciding on whether or not to return to the hospital. Pt will FU with Dr. Ferrara on Monday (10/12). C/w ASA.

## 2020-10-10 NOTE — DISCHARGE NOTE PROVIDER - CARE PROVIDER_API CALL
Clyde Geller)  Cardiovascular Disease; Interventional Cardiology  [address] [place] [address]  Phone: [phone]  Fax: [phone]  Follow Up Time:

## 2020-10-10 NOTE — H&P ADULT - ASSESSMENT
50 Male with PMH of angina, HTN, CAD s/p PCI w/stents (most recent last 10/6), MI, HLD, polycythemia, sleep apnea, who presents with a chief complaint of right forearm pain. Pt c/o hand burning and muscle tightness as well. Found to have possible right arterial artery occlusion on duplex scan in the ED, likely from cath. Vascular sx assessed pt with u/s of the R radial artery and ulnar artery. The artery was visible via duplex. The pt likely had radial artery spasm which resolved.     # R radial artery spasm s/p cath 10/6  - duplex showed patent ulnar and radial artery  - elevate arm   - vascular sx assessed and cleared pt  - no signs of ischemic limb  - pt instructed to return to the ED if signs of ischemic limb occur      # CAD s/p PCI with stents  - c/w ASA, simvastatin    # HTN  - c/w losartan          Diet: DASH  Full Code   SCDs  no gi ppx  dispo: d/c

## 2020-10-10 NOTE — DISCHARGE NOTE PROVIDER - NSDCCPCAREPLAN_GEN_ALL_CORE_FT
PRINCIPAL DISCHARGE DIAGNOSIS  Diagnosis: Spasm of artery  Assessment and Plan of Treatment: This can occur once artery is manipulated. Your symptoms have improved. Your radial and ulnar artery were assessed using doppler. Good flow was present in both. If you experince severe arm pain, blue/black/gray discoloration of the hand/digits, numbness of the right hand, please return to the ED for evaluation. Keep the arm elevated. Continue with Aspirin 81 mg daily. Please follow-up with your cardiologist within 1 week.

## 2020-10-10 NOTE — H&P ADULT - NSHPLABSRESULTS_GEN_ALL_CORE
LABS:                        14.9   11.06 )-----------( 328      ( 10 Oct 2020 18:41 )             49.5     10-10    141  |  100  |  14  ----------------------------<  89  4.1   |  27  |  1.1    Ca    9.8      10 Oct 2020 18:41    TPro  7.4  /  Alb  4.7  /  TBili  0.5  /  DBili  x   /  AST  26  /  ALT  38  /  AlkPhos  80  10-10    PT/INR - ( 10 Oct 2020 18:41 )   PT: 11.50 sec;   INR: 1.00 ratio         PTT - ( 10 Oct 2020 18:41 )  PTT:30.0 sec    Troponin trend:    ECG:  NSR  normal

## 2020-10-10 NOTE — DISCHARGE NOTE PROVIDER - NSDCMRMEDTOKEN_GEN_ALL_CORE_FT
losartan 25 mg oral tablet: 1 tab(s) orally once a day  Low Dose ASA 81 mg oral tablet: 1 tab(s) orally once a day  simvastatin 20 mg oral tablet: 1 tab(s) orally once a day (at bedtime)

## 2020-10-10 NOTE — ED PROVIDER NOTE - CLINICAL SUMMARY MEDICAL DECISION MAKING FREE TEXT BOX
Patient presented with right forearm pain s/p cath. Otherwise afebrile, HD stable, but (+) marked swelling on exam with diffuse tenderness. No crepitus or overlying skin changes to suggest nec fasc and pain is not out of proportion to exam. Obtained labs which were grossly unremarkable including no significant leukocytosis, anemia, signs of dehydration/ISHA, transaminitis or significant electrolyte abnormalities. DVT study negative but arterial duplex shows (+) radial artery occlusion. Consulted vascular who will see patient in ED. GIven occlusion and marked swelling (no current concern for compartment syndrome but will need serial exams to ensure it does not worsen), will admit with vascular on consult. Patient agreeable with plan. HD stable at time of admission.

## 2020-10-10 NOTE — ED ADULT NURSE NOTE - NSIMPLEMENTINTERV_GEN_ALL_ED
Implemented All Universal Safety Interventions:  [place] to call system. Call bell, personal items and telephone within reach. Instruct patient to call for assistance. Room bathroom lighting operational. Non-slip footwear when patient is off stretcher. Physically safe environment: no spills, clutter or unnecessary equipment. Stretcher in lowest position, wheels locked, appropriate side rails in place.

## 2020-10-10 NOTE — ED PROVIDER NOTE - PROGRESS NOTE DETAILS
Spoke with vascular tech regarding arterial study - + right radial arterial occulusion - discussed with dr carreon , who recommends vascular consult and heparin bolus and infusion vascular surgery consulted vascular evaluated pt, did bedside US and does not see arterial occlusion, may have been arterial spasm which at sono. does not recommend heparin at this time. will admit for observation and serial exams

## 2020-10-10 NOTE — H&P ADULT - ATTENDING COMMENTS
51 YO M with a PMH of angina, HTN, CAD s/p PCI w/stents, HLD, polycythemia, and EDDIE who presents to the hospital with a c/o right forearm pain/swelling for the past x 1 day. Associated with a feeling of burning in the right hand. Of note, the pt had a CATH procedure and was cannulated through the RUE on 10/6. In the ED, a preliminary read on the RUE duplex was thought to show radial artery occlusion. Vascular was consulted and they performed RUE duplex and there was patent pulsating ulnar and radial arteries in RUE. Palmar arch patent. Radial artery occlusion seen on Duplex was likely d/t arterial spasm. They recommended ASA and to FU with Dr. Ferrara in a week.     Physical exam shows pt in NAD. VSS, afebrile, not hypoxic on RA. A&Ox3. Non-focal neuro exam. Muscle strength/sensation intact. CTA B/L with no W/C/R. RRR, no M/G/R. ABD is soft and non-tender, normoactive BSs. RUE with no swelling, sensation intact, pulses palpated, and full active/passive ROM. No rashes. Labs and radiology as above.     RUE pain/swelling likely due to radial artery spasm s/p CATH, resolved. Pt can be discharged with out-pt FU at Dr. Roberto office. Pt educated on signs and symptoms (swelling, numbness, burning, and pain) to look out for when deciding whether or not to return to the hospital. Cleared from vascular stand-point. C/w ASA. Elevate arm.     Hx of angina, HTN, CAD s/p PCI w/stents, HLD, polycythemia, and EDDIE. Restart home meds. DVT PPX. Inform PCP of pt's admission to hospital. My note supersedes the residents note. 51 YO M with a PMH of angina, HTN, CAD s/p PCI w/stents, HLD, polycythemia, and EDDIE who presents to the hospital with a c/o right forearm pain/swelling for the past x 1 day. Associated with a feeling of burning in the right hand. Of note, the pt had a CATH procedure and was cannulated through the RUE on 10/6. In the ED, a preliminary read on the RUE duplex was thought to show radial artery occlusion. Vascular was consulted and they performed RUE duplex and there was patent pulsating ulnar and radial arteries in RUE. Palmar arch patent. Radial artery occlusion seen on Duplex was likely d/t arterial spasm. They recommended ASA and to FU with Dr. Ferrara in a week.     Physical exam shows pt in NAD. VSS, afebrile, not hypoxic on RA. A&Ox3. Non-focal neuro exam. Muscle strength/sensation intact. CTA B/L with no W/C/R. RRR, no M/G/R. ABD is soft and non-tender, normoactive BSs. RUE with no swelling, sensation intact, pulses palpated, and full active/passive ROM. No rashes. Labs and radiology as above.     RUE pain/swelling likely due to radial artery spasm s/p CATH, resolved. Pt can be discharged with out-pt FU at Dr. Roberto office. Pt educated on signs and symptoms (swelling, numbness, burning, and pain) to look out for when deciding whether or not to return to the hospital. Cleared from vascular stand-point. C/w ASA. Elevate arm.     Hx of angina, HTN, CAD s/p PCI w/stents, HLD, polycythemia, and EDDIE. Restart home meds. DVT PPX. Inform PCP of pt's admission to hospital. My note supersedes the residents note.    Discharge pt home. Extensive counseling on signs and symptoms to monitor for were given. Pt will need to FU with Dr. Ferrara. C/w ASA. 49 YO M with a PMH of angina, HTN, CAD s/p PCI w/stents, HLD, polycythemia, and EDDIE who presents to the hospital with a c/o right forearm pain/swelling for the past x 1 day. Associated with a feeling of burning in the right hand. Of note, the pt had a CATH procedure and was cannulated through the RUE on 10/6. In the ED, a preliminary read on the RUE duplex was thought to show radial artery occlusion. Vascular was consulted and they performed RUE duplex and there was patent pulsating ulnar and radial arteries in RUE. Palmar arch patent. Radial artery occlusion seen on Duplex was likely d/t arterial spasm. They recommended ASA and to FU with Dr. Ferrara in a week.     Physical exam shows pt in NAD. VSS, afebrile, not hypoxic on RA. A&Ox3. Non-focal neuro exam. Muscle strength/sensation intact. CTA B/L with no W/C/R. RRR, no M/G/R. ABD is soft and non-tender, normoactive BSs. RUE with no swelling, sensation intact, pulses palpated, and full active/passive ROM. No rashes. Labs and radiology as above.     RUE pain/swelling likely due to radial artery spasm s/p CATH, resolved. Pt can be discharged with out-pt FU at Dr. Roberto office. Pt educated on signs and symptoms (swelling, numbness, burning, and pain) to look out for when deciding whether or not to return to the hospital. Cleared from vascular stand-point. C/w ASA. Elevate arm.     Tobacco abuse with counseling. Pt extensively counseled on the benefits of smoking cessation and alternative therapies. Pt would like to think about his options prior to making a decision.     Hx of angina, HTN, CAD s/p PCI w/stents, HLD, polycythemia, and EDDIE. Restart home meds. DVT PPX. Inform PCP of pt's admission to hospital. My note supersedes the residents note.    Discharge pt home. Extensive counseling on signs and symptoms to monitor for were given. Pt will need to FU with Dr. Ferrara. C/w ASA.

## 2020-10-10 NOTE — ED PROVIDER NOTE - ATTENDING CONTRIBUTION TO CARE
50 year old male, pmhx as documented, presenting with right arm pain and swelling s/p cardiac cath last week. States pain has been getting worse and is described as tightness, non-radiating, no palliative or provocative factors, moderate severity. Otherwise denies fevers, numbness to the arm, muscle weakness, or any other complaints.    Vital Signs: I have reviewed the initial vital signs.  Constitutional: NAD, well-nourished, appears stated age, no acute distress.  HEENT: Airway patent, moist MM, no erythema/swelling/deformity of oral structures. EOMI, PERRLA.  CV: regular rate, regular rhythm, well-perfused extremities, 2+ b/l DP and radial pulses equal.  Lungs: BCTA, no increased WOB.  ABD: NTND, no guarding or rebound, no pulsatile mass, no hernias.   MSK: Neck supple, nontender, nl ROM, no stepoff. Chest nontender. Back nontender in TLS spine or to b/l bony structures or flanks. (+) right forearm (+) marked swelling to right forearm but neurovascularly intact, (+) diffusely tender on exam. Other Ext nontender, nl rom, no deformity.   INTEG: Skin warm, dry, no rash.  NEURO: A&Ox3, normal strength, nl sensation throughout, normal speech.   PSYCH: Calm, cooperative, normal affect and interaction.    Will obtain DVT study, consider arterial duplex as well given significant swelling, re-eval.

## 2020-10-10 NOTE — CONSULT NOTE ADULT - ASSESSMENT
ASSESSMENT: Patient is a 50y old m with PMH of angina, HTN, coronary stents, MI, HLD, polycythemia, sleep apnea, who presents with a chief complaint of right forearm pain. Pt is s/p coronary cath on Tuesday with Dr. Geller, presents with right arm pain and swelling.    PLAN:   - Bedside US performed by Dr. Mariscal, patent pulsating ulnar and radial arteries RUE. Palmar arch patent. Radial artery occlusion seen on Duplex was likely d/t arterial spasm  - recommend continuation of ASA  - recommend arm elevation  - recommend pt return to ED if symptoms do not resolve or get worse  - Patient seen/examined with Fellow, Dr. Mariscal  - Plan to be discussed with Attending, Dr. Galaviz

## 2020-10-11 NOTE — DISCHARGE NOTE NURSING/CASE MANAGEMENT/SOCIAL WORK - PATIENT PORTAL LINK FT
You can access the FollowMyHealth Patient Portal offered by [facility] by registering at the following website: [email or web address]. By joining [facility]’s FollowMyHealth portal, you will also be able to view your health information using other applications (apps) compatible with our system.

## 2020-10-16 DIAGNOSIS — Z71.89 OTHER SPECIFIED COUNSELING: ICD-10-CM

## 2021-11-01 PROCEDURE — G9005: CPT

## 2022-03-16 NOTE — H&P PST ADULT - LYMPH NODES
Goal Outcome Evaluation:    [id number]    A/Ox4. Afebrile. OVSS on RA. Denies pain, SOB and N/V. Poor appetite - clear liquid diet maintained.  and 330 - insulin given per parameters. Pt voiding spontaneously with adequate UOP. LBM 3/14 - scheduled miralax given; continues to pass flatus. L-PIV infusing N.S. at 125ml/hr. UAL. Continue with POC.     No lymphadedenopathy

## 2022-03-24 ENCOUNTER — APPOINTMENT (OUTPATIENT)
Dept: PULMONOLOGY | Facility: CLINIC | Age: 52
End: 2022-03-24
Payer: MEDICAID

## 2022-03-24 ENCOUNTER — NON-APPOINTMENT (OUTPATIENT)
Age: 52
End: 2022-03-24

## 2022-03-24 VITALS
DIASTOLIC BLOOD PRESSURE: 80 MMHG | OXYGEN SATURATION: 97 % | SYSTOLIC BLOOD PRESSURE: 124 MMHG | HEART RATE: 95 BPM | RESPIRATION RATE: 16 BRPM | TEMPERATURE: 97.1 F | BODY MASS INDEX: 35.31 KG/M2 | WEIGHT: 233 LBS | HEIGHT: 68 IN

## 2022-03-24 DIAGNOSIS — R06.02 SHORTNESS OF BREATH: ICD-10-CM

## 2022-03-24 DIAGNOSIS — Z86.39 PERSONAL HISTORY OF OTHER ENDOCRINE, NUTRITIONAL AND METABOLIC DISEASE: ICD-10-CM

## 2022-03-24 DIAGNOSIS — J44.9 CHRONIC OBSTRUCTIVE PULMONARY DISEASE, UNSPECIFIED: ICD-10-CM

## 2022-03-24 DIAGNOSIS — R26.89 OTHER ABNORMALITIES OF GAIT AND MOBILITY: ICD-10-CM

## 2022-03-24 DIAGNOSIS — Z12.2 ENCOUNTER FOR SCREENING FOR MALIGNANT NEOPLASM OF RESPIRATORY ORGANS: ICD-10-CM

## 2022-03-24 DIAGNOSIS — G60.9 HEREDITARY AND IDIOPATHIC NEUROPATHY, UNSPECIFIED: ICD-10-CM

## 2022-03-24 DIAGNOSIS — F17.200 NICOTINE DEPENDENCE, UNSPECIFIED, UNCOMPLICATED: ICD-10-CM

## 2022-03-24 DIAGNOSIS — G47.33 OBSTRUCTIVE SLEEP APNEA (ADULT) (PEDIATRIC): ICD-10-CM

## 2022-03-24 DIAGNOSIS — F17.210 NICOTINE DEPENDENCE, CIGARETTES, UNCOMPLICATED: ICD-10-CM

## 2022-03-24 DIAGNOSIS — Z83.3 FAMILY HISTORY OF DIABETES MELLITUS: ICD-10-CM

## 2022-03-24 DIAGNOSIS — E66.3 OVERWEIGHT: ICD-10-CM

## 2022-03-24 DIAGNOSIS — D45 POLYCYTHEMIA VERA: ICD-10-CM

## 2022-03-24 DIAGNOSIS — Z99.89 OBSTRUCTIVE SLEEP APNEA (ADULT) (PEDIATRIC): ICD-10-CM

## 2022-03-24 PROCEDURE — 99406 BEHAV CHNG SMOKING 3-10 MIN: CPT | Mod: 25

## 2022-03-24 PROCEDURE — 94010 BREATHING CAPACITY TEST: CPT

## 2022-03-24 PROCEDURE — 94618 PULMONARY STRESS TESTING: CPT

## 2022-03-24 PROCEDURE — 99204 OFFICE O/P NEW MOD 45 MIN: CPT | Mod: 25

## 2022-03-24 PROCEDURE — 71046 X-RAY EXAM CHEST 2 VIEWS: CPT

## 2022-03-24 PROCEDURE — 95012 NITRIC OXIDE EXP GAS DETER: CPT

## 2022-03-24 RX ORDER — FLUTICASONE FUROATE, UMECLIDINIUM BROMIDE AND VILANTEROL TRIFENATATE 200; 62.5; 25 UG/1; UG/1; UG/1
200-62.5-25 POWDER RESPIRATORY (INHALATION)
Qty: 3 | Refills: 1 | Status: ACTIVE | COMMUNITY
Start: 2022-03-24 | End: 1900-01-01

## 2022-03-24 RX ORDER — NICOTINE 4 MG/1
10 INHALANT RESPIRATORY (INHALATION)
Qty: 1 | Refills: 5 | Status: ACTIVE | COMMUNITY
Start: 2022-03-24 | End: 1900-01-01

## 2022-03-24 NOTE — HISTORY OF PRESENT ILLNESS
[TextBox_4] : Mr. RICHARD is a 51 year old male with a history of CAD, MI, multiple stents 2016, EDDIE 2016, HLD, OW erythrocytosis, polycythemia, peripheral neuropathy "COPD", former and current 30+ smoker, nicotine addiction presenting to the office today for initial pulmonary evaluation. His chief complaint is sob/ nicotine addiciton/ \par -he notes his polycythemia isn't feeling better, \par -he notes his primary told him to use CPAP machine but he disregarded it \par -he notes doing flomotamies, but not anymore since he is losing iron \par -he notes he was getting relieve when he was doing flobodomies\par -he notes sob stairs, inclines, etc.\par -he denies heartburn \par -he notes aches in his feet\par -he notes toes numb (few months) \par -he notes taking gabapatine (due to nerve dmg in neck \par -he notes having cpap pressure of 4 cm  \par -he notes if he uses any kind of masks, it burns his nostrils (given a few years ago from a place in [place]) \par -he notes when he sleeps, he notes sleeping with his mouth open \par -he notes being able to sleep in the car if someone else were to drive \par -he notes memory and concentration is alright \par -he notes cold air bothers him \par -he notes balance is not that great \par -he notes being tired \par -he notes some sob in the cold air \par -he denies reflux \par -he notes stable weight at 240 lbs\par -he notes covid-19 vaccine x2 \par -he notes neuropathy has been there for some time (L5, multiple slipped disk in neck) (more than 5 years)\par -he notes being told to exercise, walk, \par -he denies PND \par -he notes having a burning sensation in his eyes and needing to squint a lot \par -he notes having visual issues \par -he notes completing lung cancer screen CT (chest and pelvis) \par -he denies any headaches, nausea, vomiting, fever, chills, sweats, chest pain, chest pressure, diarrhea, constipation, dysphagia, dizziness, leg swelling, leg pain, itchy eyes, itchy ears, heartburn, reflux, sour taste in the mouth, myalgias or arthralgias.

## 2022-03-24 NOTE — PROCEDURE
[FreeTextEntry1] : PFT - spi reveals mild restrictive flows; FEV1 is 2.53 which is 70% of predicted, normal flow volume loop \par \par CXR reveals a normal sized heart; no evidence of infiltrate or effusion--calcified cartilage\par \par 6 minute walk test reveals a low saturation of 98% with very slight evidence of dyspnea or fatigue; walked 358.6  meters  \par \par CT scan (2/21/22) showed Right lung showed 2 mm nodule in RLL; Left lung showed 1-2mm perivascular nodule centrality in apex. 2 mm nodule in upper lobe, 2 mm perifissural nodule in ll, 2 mm subpleural nodule in the lower lobe \par \par \par FENO was   19    ; a normal value being less than 25\par Fractional exhaled nitric oxide (FENO) is regarded as a simple, noninvasive method for assessing eosinophilic airway inflammation. Produced by a variety of cells within the lung, nitric oxide (NO) concentrations are generally low in healthy individuals. However, high concentrations of NO appear to be involved in nonspecific host defense mechanisms and chronic inflammatory diseases such as asthma. The American Thoracic Society (ATS) therefore has recommended using FENO to aid in the diagnosis and monitoring of eosinophilic airway inflammation and asthma, and for identifying steroid responsive individuals whose chronic respiratory symptoms may be caused by airway inflammation.

## 2022-03-24 NOTE — ASSESSMENT
[FreeTextEntry1] : Mr. RICHARD is a 51 year old male with a history of CAD, MI, multiple stents 2016, EDDIE 2016, HLD, OW erythrocytosis, polycythemia, peripheral neuropathy "COPD", former and current 30+ smoker, nicotine addiction presenting to the office today for initial pulmonary evaluation. His chief complaint is sob/ RADS/ nicotine addiction/COPD/EDDIE \par \par His shortness of breath is multifactorial due to:\par -poor mechanics of breathing \par -out of shape / overweight\par -pulmonary disease\par    -Full PFTs to follow\par -cardiac disease \par \par Problem 1: RADS \par -Recommended Weight Loss \par -Recommended Muniq "OTC" Supplement for controlling blood sugar levels, weight loss, and energy \par Asthma is  believed to be caused by inherited (genetic) and environmental factor, but its exact cause is unknown. Asthma may be triggered by allergens, lung infections, or irritants in the air. Asthma triggers are different for each person \par \par Problem 2: COPD \par -add Trelegy 1 puff QD \par -Full PFTs to follow \par COPD is a progressive disease and although it can’t be cured , appropriate management can slow its progression, reduce frequency and severity of exacerbations, and improve symptoms and the patient quality of life. Hospitalizations are the greatest contributor to the total COPD costs and account for up to 87% of total COPD related costs. Exacerbations are the main cause of admissions and subsequently account for the 40-75% of COPD costs. Inhaled maintenance therapy reduces the incidence of exacerbations in patients with stable COPD. Incorrect inhaler use and nonadherence are major obstacles to achieving COPD treatment goals. Many COPD patients have challenges (impaired inhalation, limited dexterity, reduced cognition: that limit their ability to correctly use their COPD treatment devices resulting in reduced symptom control. Of most importance is smoking cessation and early intervention with respiratory illnesses and contemplation for pulmonary rehab to enhance quality of life. \par \par Problem 3: EDDIE (PCP, snoring, neck size, Mallampati class) \par -Reset APAP Dreamwear APAP with pressure 4-6 cm \par -Repeat Split night sleep study \par -Recommended Frandy Cornejo \par Sleep apnea is associated with adverse clinical consequences which can affect most organ systems.  Cardiovascular disease risk includes arrhythmias, atrial fibrillation, hypertension, coronary artery disease, and stroke. Metabolic disorders include diabetes type 2, non-alcoholic fatty liver disease. Mood disorder especially depression; and cognitive decline especially in the elderly. Associations with  chronic reflux/Schneider’s esophagus some but not all inclusive. \par -Reasons  include arousal consistent with hypopnea; respiratory events most prominent in REM sleep or supine position; therefore sleep staging and body position are important for accurate diagnosis and estimation of AHI. \par \par Problem 4: Nicotine Addiction (3.24.2022) \par -Add Nicotrol \par Discussed for five minutes with the patient the risks/associations with continued smoking including COPD, emphysema, shortness of breath, renal cancer, bladder cancer, stroke risk, cardiac disease, etc. Smoking cessation was discussed at length and highly encouraged. Various options to aid cessation was discussed including use of Chantix, Nicotrol, nicotine products, laser therapy, hypnosis, Wellbutrin, etc \par \par Problem 5:  Lung Cancer Screening \par -Complete Yearly lung CT scan \par Lung cancer screening is recommended for people between the ages of 55 and 80 with prior 30+ pack year smoking histories. There is irrefutable evidence for realization of lung cancer screening based on two large randomized control trials demonstrating relative reduction in lung cancer mortality for patients undergoing low-dose CT scanning. Risks and benefits reviewed with the patient. \par \par \par problem : cardiac disease\par -recommended to continue to follow up with Cardiologist (Clyde Pinto) \par \par problem : poor breathing mechanics\par -Recommended NeuroRenew \par -Recommended Balance therapy \par -Proper breathing techniques were reviewed with an emphasis of exhalation. Patient instructed to breath in for 1 second and out for four seconds. Patient was encouraged to not talk while walking. \par \par problem : out of shape / overweight\par -Recommended weight loss coaches Herman Monaco/ Kartik \par -Weight loss, exercise, and diet control were discussed and are highly encouraged. Treatment options were given such as, aqua therapy, and contacting a nutritionist. Recommended to use the elliptical, stationary bike, less use of treadmill. Mindful eating was explained to the patient Obesity is associated with worsening asthma, shortness of breath, and potential for cardiac disease, diabetes, and other underlying medical conditions\par \par problem : health maintenance \par -Recommended Balance therapy \par -recommended yearly flu shot after October 15\par -recommended strep pneumonia vaccines: Prevnar-13 vaccine, followed by Pneumo vaccine 23 one year following after 65\par -recommended early intervention for Upper Respiratory Infections (URIs)\par -recommended regular osteoporosis evaluations\par -recommended early dermatological evaluations\par -recommended after the age of 50 to the age of 70, colonoscopy every 5 years\par \par F/U in 6-8 weeks.\par He is encouraged to call with any changes, concerns, or questions

## 2022-03-24 NOTE — PHYSICAL EXAM
[No Acute Distress] : no acute distress [Normal Oropharynx] : normal oropharynx [Normal Appearance] : normal appearance [No Neck Mass] : no neck mass [Normal Rate/Rhythm] : normal rate/rhythm [Normal S1, S2] : normal s1, s2 [No Murmurs] : no murmurs [No Resp Distress] : no resp distress [Clear to Auscultation Bilaterally] : clear to auscultation bilaterally [No Abnormalities] : no abnormalities [Benign] : benign [Normal Gait] : normal gait [No Clubbing] : no clubbing [No Cyanosis] : no cyanosis [No Edema] : no edema [FROM] : FROM [Normal Color/ Pigmentation] : normal color/ pigmentation [No Focal Deficits] : no focal deficits [Oriented x3] : oriented x3 [Normal Affect] : normal affect [III] : Mallampati Class: III [TextBox_2] : ow [TextBox_68] : I:E ratio 1:3; clear

## 2022-03-24 NOTE — ADDENDUM
[FreeTextEntry1] : Documented by Alessio Durant acting as a scribe for Dr. Andre Stark on 03/24/2022 .\par \par All medical record entries made by the Scribe were at my, Dr. Andre Stark's, direction and personally dictated by me on. I have reviewed the chart and agree that the record accurately reflects my personal performance of the history, physical exam, assessment and plan. I have also personally directed, reviewed, and agree with the discharge instructions.

## 2022-05-27 ENCOUNTER — APPOINTMENT (OUTPATIENT)
Dept: PULMONOLOGY | Facility: CLINIC | Age: 52
End: 2022-05-27

## 2022-09-01 ENCOUNTER — TRANSCRIPTION ENCOUNTER (OUTPATIENT)
Age: 52
End: 2022-09-01

## 2022-11-15 ENCOUNTER — OUTPATIENT (OUTPATIENT)
Dept: OUTPATIENT SERVICES | Facility: HOSPITAL | Age: 52
LOS: 1 days | Discharge: HOME | End: 2022-11-15

## 2022-11-15 ENCOUNTER — TRANSCRIPTION ENCOUNTER (OUTPATIENT)
Age: 52
End: 2022-11-15

## 2022-11-15 ENCOUNTER — RESULT REVIEW (OUTPATIENT)
Age: 52
End: 2022-11-15

## 2022-11-15 VITALS
SYSTOLIC BLOOD PRESSURE: 144 MMHG | HEIGHT: 69 IN | HEART RATE: 95 BPM | TEMPERATURE: 97 F | RESPIRATION RATE: 18 BRPM | WEIGHT: 240.08 LBS | DIASTOLIC BLOOD PRESSURE: 80 MMHG

## 2022-11-15 VITALS
RESPIRATION RATE: 18 BRPM | SYSTOLIC BLOOD PRESSURE: 120 MMHG | DIASTOLIC BLOOD PRESSURE: 71 MMHG | OXYGEN SATURATION: 97 % | HEART RATE: 95 BPM

## 2022-11-15 PROCEDURE — 88305 TISSUE EXAM BY PATHOLOGIST: CPT | Mod: 26

## 2022-11-15 RX ORDER — METOPROLOL TARTRATE 50 MG
1 TABLET ORAL
Qty: 0 | Refills: 0 | DISCHARGE

## 2022-11-15 RX ORDER — GABAPENTIN 400 MG/1
0 CAPSULE ORAL
Qty: 0 | Refills: 0 | DISCHARGE

## 2022-11-15 RX ORDER — NORTRIPTYLINE HYDROCHLORIDE 10 MG/1
0 CAPSULE ORAL
Qty: 0 | Refills: 0 | DISCHARGE

## 2022-11-15 RX ORDER — LOSARTAN POTASSIUM 100 MG/1
1 TABLET, FILM COATED ORAL
Qty: 0 | Refills: 0 | DISCHARGE

## 2022-11-15 RX ORDER — SIMVASTATIN 20 MG/1
1 TABLET, FILM COATED ORAL
Qty: 0 | Refills: 0 | DISCHARGE

## 2022-11-15 RX ORDER — ASPIRIN/CALCIUM CARB/MAGNESIUM 324 MG
1 TABLET ORAL
Qty: 0 | Refills: 0 | DISCHARGE

## 2022-11-15 NOTE — ASU DISCHARGE PLAN (ADULT/PEDIATRIC) - CARE PROVIDER_API CALL
Med Aquino (DO)  Gastroenterology; Internal Medicine  [address] [place] [address]  Phone: [phone]  Fax: [phone]  Established Patient  Follow Up Time:

## 2022-11-15 NOTE — ASU PATIENT PROFILE, ADULT - FALL HARM RISK - UNIVERSAL INTERVENTIONS
Bed in lowest position, wheels locked, appropriate side rails in place/Call bell, personal items and telephone in reach/Instruct patient to call for assistance before getting out of bed or chair/Non-slip footwear when patient is out of bed/[place] to call system/Purposeful Proactive Rounding/Room/bathroom lighting operational, light cord in reach

## 2022-11-15 NOTE — PRE-ANESTHESIA EVALUATION ADULT - NSATTENDATTESTRD_GEN_ALL_CORE
O/N: Admitted w infected R 3rd toe amp site, preopped and consented                                                46 y/o M pt with PMHx of DM, CAD, HTN, HLD  s/p amputation of R 3rd toe for gangrene  9/28/21, admitted w infection to amputation site.      -Keep NPO   -Consented for Debridement and bone resection  -Home meds  -Lantus 1/2'd to 18U  -IV Abx Vanc/Zosyn     O/N: Admitted w infected R 3rd toe amp site, preopped and consented          S: Patient does not have any complaints    O: Examined in bed resting comfortably     ROS: Denies headache, blurred vision, chest pain, SOB, abdominal pain, nausea or vomiting.         piperacillin/tazobactam IVPB.. 3.375  vancomycin  IVPB 1250  aspirin enteric coated 81  clopidogrel Tablet 75  heparin   Injectable 5000  lisinopril 20  piperacillin/tazobactam IVPB.. 3.375  vancomycin  IVPB 1250      Allergies    No Known Allergies    Intolerances        Vital Signs Last 24 Hrs  T(C): 36.6 (28 Oct 2021 05:21), Max: 37 (27 Oct 2021 21:54)  T(F): 97.8 (28 Oct 2021 05:21), Max: 98.6 (27 Oct 2021 21:54)  HR: 70 (28 Oct 2021 06:00) (70 - 78)  BP: 127/68 (28 Oct 2021 06:00) (124/63 - 136/88)  BP(mean): --  RR: 16 (28 Oct 2021 06:00) (16 - 18)  SpO2: 96% (28 Oct 2021 06:00) (96% - 98%)  I&O's Summary      Physical Exam:    Constitutional: Pt AXOX3 in NAD  Respiratory: Non labored  Cardiovascular: S1S2  Extremities: R third toe amp site with fibrinous tissue, with mild surrounding erythema/ mild drainage, no LE edema, calfs soft and non tender  Vascular: R Tri DP/PT L palp DP      LABS:                        12.6   7.49  )-----------( 199      ( 28 Oct 2021 06:45 )             37.3     10-28    139  |  104  |  13  ----------------------------<  237<H>  4.6   |  26  |  0.69    Ca    9.1      28 Oct 2021 06:45  Phos  4.7     10-28  Mg     1.6     10-28    TPro  7.6  /  Alb  4.6  /  TBili  0.7  /  DBili  x   /  AST  19  /  ALT  25  /  AlkPhos  59  10-27    PT/INR - ( 27 Oct 2021 20:06 )   PT: 11.6 sec;   INR: 0.97          PTT - ( 27 Oct 2021 20:06 )  PTT:28.5 sec      --------------------------------------------------------------------------  PLEASE CHECK WHEN PRESENT:     [  ]Heart Failure     [  ] Acute     [  ] Acute on Chronic     [  ] Chronic  -------------------------------------------------------------------     [  ]Diastolic [HFpEF]     [  ]Systolic [HFrEF]     [  ]Combined [HFpEF & HFrEF]  .................................................................................     [  ]Other:     [ ] Pulmonary Hypertension     [ ] Afib     [x ] Hypertensive Heart Disease  -------------------------------------------------------------------  [ ] Respiratory failure  [ ] Acute cor pulmonale  [ ] Asthma/COPD Exacerbation  [ ] Pleural effusion  [ ] Aspiration pneumonia  [ ] Obstructive Sleep Apnea  -------------------------------------------------------------------  [  ]ROSANNE     [  ]ATN     [  ]Reneal Medullary Necrosis     [  ]Renal Cortical Necrosis     [  ]Other Pathological Lesions:    [  ]CKD 1  [  ]CKD 2  [  ]CKD 3  [  ]CKD 4  [  ]CKD 5  [  ]Other  -------------------------------------------------------------------  [ x ]Diabetes  [  ] Diabetic PVD Ulcer  [ x ] Neuropathic ulcer to DM  [  ] Diabetes with Nephropathy  [  ] Osteomyelitis due to diabetes  [  ] Hyperglycemia   [  ]hypoglycemia   --------------------------------------------------------------------  [  ]Malnutrition: See Nutrition Note  [  ]Cachexia  [  ]Other:   [  ]Supplement Ordered:  [  ]Morbid Obesity (BMI >=40]  ---------------------------------------------------------------------  [ ] Sepsis/severe sepsis/septic shock  [ ] Noninfectious SIRS  [ ] UTI  [ ] Pneumonia  -----------------------------------------------------------------------  [ ] Acidosis/alkalosis  [ ] Fluid overload  [ ] Hypokalemia  [ ] Hyperkalemia  [ ] Hypomagnesemia  [ ] Hypophosphatemia  [ ] Hyperphosphatemia  ------------------------------------------------------------------------  [ ] Acute blood loss anemia  [ ] Post op blood loss anemia  [ ] Iron deficiency anemia  [ ] Anemia due to chronic disease  [ ] Hypercoagulable state  [ ] Thrombocytopenia  ----------------------------------------------------------------------  [ ] Cerebral infarction  [ ] Transient ischemia attack  [ ] Encephalopathy - Toxic or Metabolic      48 y/o M pt with PMHx of DM, CAD, HTN, HLD  s/p amputation of R 3rd toe for gangrene  9/28/21, admitted w infection to amputation site.    Vascular/PAD:  -right 3rd toe amputation site with infection   - daily dressing changes  -Continue ASA+ plavix   -OR today for toe debridement    HLD:  -c/w Atorvastatin 40    HTN:   - c/w lisinopril      CAD/CHF:   -Hx of CAD s/p stents at OSH   -Continue ASA  + Plavix and statin     DM:  -ISS   -Lantus at bedtime      ID:  -OR for infcted toe debridement  -Continue Vanc+ zosyn for now       DVT PPx: SQH      The patient has been re-examined and I agree with the above assessment or I updated with my findings.

## 2022-11-15 NOTE — ASU PATIENT PROFILE, ADULT - NSICDXPASTMEDICALHX_GEN_ALL_CORE_FT
PAST MEDICAL HISTORY:  Heart attack 6 years ago, 4 stents placed    History of angina     HTN (hypertension)     Hypercholesteremia     Obesity     Polycythemia     Sleep apnea

## 2022-11-15 NOTE — CHART NOTE - NSCHARTNOTEFT_GEN_A_CORE
ANESTHESIA to PACU NOTE      ____ Intubated  TV:______       Rate: ______      FiO2: ______    __x__ Patent Airway    ___x_ Full return of protective reflexes    ____ Full recovery from anesthesia / sedation to baseline status    Vitals:  HR 97  /80  RR  15  O2sat. 100%  Temp: 36.5C      Mental Status:  __x__ Awake   ___x__ Alert   _____ Drowsy   _____ Sedated    Nausea/Vomiting: ____ Yes, See Post - Op Orders      __x__ No    Pain Scale (0-10): _____    Treatment: _x___ None    ____ See Post - Op/PCA Orders    Post - Operative Fluids:   ____ Oral   __x__ See Post - Op Orders    Plan:  Discharge to:   __x_Home       _____Floor      _____Critical Care    _____ Other:_________________    Comments: s/p TIVA. No anesthesia complications. Pt's condition is stable in PACU. Full report is given to PACU RN.

## 2022-11-16 LAB — SURGICAL PATHOLOGY STUDY: SIGNIFICANT CHANGE UP

## 2022-11-18 DIAGNOSIS — K64.8 OTHER HEMORRHOIDS: ICD-10-CM

## 2022-11-18 DIAGNOSIS — Z12.11 ENCOUNTER FOR SCREENING FOR MALIGNANT NEOPLASM OF COLON: ICD-10-CM

## 2022-11-18 DIAGNOSIS — G47.33 OBSTRUCTIVE SLEEP APNEA (ADULT) (PEDIATRIC): ICD-10-CM

## 2022-11-18 DIAGNOSIS — D75.1 SECONDARY POLYCYTHEMIA: ICD-10-CM

## 2022-11-18 DIAGNOSIS — F17.210 NICOTINE DEPENDENCE, CIGARETTES, UNCOMPLICATED: ICD-10-CM

## 2022-11-18 DIAGNOSIS — I25.10 ATHEROSCLEROTIC HEART DISEASE OF NATIVE CORONARY ARTERY WITHOUT ANGINA PECTORIS: ICD-10-CM

## 2022-11-18 DIAGNOSIS — I25.2 OLD MYOCARDIAL INFARCTION: ICD-10-CM

## 2022-11-18 DIAGNOSIS — K57.30 DIVERTICULOSIS OF LARGE INTESTINE WITHOUT PERFORATION OR ABSCESS WITHOUT BLEEDING: ICD-10-CM

## 2022-11-18 DIAGNOSIS — E78.00 PURE HYPERCHOLESTEROLEMIA, UNSPECIFIED: ICD-10-CM

## 2022-11-18 DIAGNOSIS — E66.9 OBESITY, UNSPECIFIED: ICD-10-CM

## 2022-11-18 DIAGNOSIS — K56.2 VOLVULUS: ICD-10-CM

## 2022-11-18 DIAGNOSIS — I10 ESSENTIAL (PRIMARY) HYPERTENSION: ICD-10-CM

## 2022-11-18 DIAGNOSIS — K63.5 POLYP OF COLON: ICD-10-CM

## 2022-12-21 PROBLEM — I21.9 ACUTE MYOCARDIAL INFARCTION, UNSPECIFIED: Chronic | Status: ACTIVE | Noted: 2019-05-29

## 2022-12-21 PROBLEM — G47.30 SLEEP APNEA, UNSPECIFIED: Chronic | Status: ACTIVE | Noted: 2019-05-29

## 2023-03-22 NOTE — H&P ADULT - NSHPSOCIALHISTORY_GEN_ALL_CORE
----- Message from Bret Mayer MD sent at 3/22/2023  8:13 AM CDT -----  Screening colonoscopy today was completely normal.  Recall colonoscopy in 10 years.    
Gabriela Beyer repeat colonoscopy in 10 year(s).  Checklist:  Surgical history updated.  Colonoscopy tracking completed.  Episode resolved.   Reminder entered.       
former smoker, 60 pack years   no drugs, no drinking

## 2023-05-02 ENCOUNTER — OUTPATIENT (OUTPATIENT)
Dept: OUTPATIENT SERVICES | Facility: HOSPITAL | Age: 53
LOS: 1 days | End: 2023-05-02
Payer: MEDICAID

## 2023-05-02 ENCOUNTER — OUTPATIENT (OUTPATIENT)
Dept: OUTPATIENT SERVICES | Facility: HOSPITAL | Age: 53
LOS: 1 days | End: 2023-05-02

## 2023-05-02 ENCOUNTER — APPOINTMENT (OUTPATIENT)
Dept: RADIOLOGY | Facility: HOSPITAL | Age: 53
End: 2023-05-02

## 2023-05-02 ENCOUNTER — APPOINTMENT (OUTPATIENT)
Dept: NUCLEAR MEDICINE | Facility: HOSPITAL | Age: 53
End: 2023-05-02
Payer: MEDICAID

## 2023-05-02 DIAGNOSIS — D75.1 SECONDARY POLYCYTHEMIA: ICD-10-CM

## 2023-05-02 DIAGNOSIS — D50.8 OTHER IRON DEFICIENCY ANEMIAS: ICD-10-CM

## 2023-05-02 PROCEDURE — 78582 LUNG VENTILAT&PERFUS IMAGING: CPT | Mod: 26,GC

## 2023-05-02 PROCEDURE — 71046 X-RAY EXAM CHEST 2 VIEWS: CPT | Mod: 26
